# Patient Record
Sex: MALE | Race: WHITE | NOT HISPANIC OR LATINO | Employment: OTHER | URBAN - NONMETROPOLITAN AREA
[De-identification: names, ages, dates, MRNs, and addresses within clinical notes are randomized per-mention and may not be internally consistent; named-entity substitution may affect disease eponyms.]

---

## 2018-07-20 ENCOUNTER — OFFICE VISIT (OUTPATIENT)
Dept: UROLOGY | Facility: CLINIC | Age: 58
End: 2018-07-20

## 2018-07-20 ENCOUNTER — HOSPITAL ENCOUNTER (OUTPATIENT)
Dept: GENERAL RADIOLOGY | Facility: HOSPITAL | Age: 58
Discharge: HOME | End: 2018-07-20
Attending: UROLOGY | Admitting: UROLOGY

## 2018-07-20 VITALS — WEIGHT: 195 LBS | BODY MASS INDEX: 28.88 KG/M2 | HEIGHT: 69 IN

## 2018-07-20 DIAGNOSIS — N39.0 URINARY TRACT INFECTION WITH HEMATURIA, SITE UNSPECIFIED: ICD-10-CM

## 2018-07-20 DIAGNOSIS — N20.0 RENAL STONE: Primary | ICD-10-CM

## 2018-07-20 DIAGNOSIS — R31.9 URINARY TRACT INFECTION WITH HEMATURIA, SITE UNSPECIFIED: ICD-10-CM

## 2018-07-20 DIAGNOSIS — N20.0 RENAL STONE: ICD-10-CM

## 2018-07-20 LAB
BILIRUB BLD-MCNC: NEGATIVE MG/DL
CLARITY, POC: CLEAR
COLOR UR: YELLOW
GLUCOSE UR STRIP-MCNC: NEGATIVE MG/DL
KETONES UR QL: NEGATIVE
LEUKOCYTE EST, POC: ABNORMAL
NITRITE UR-MCNC: NEGATIVE MG/ML
PH UR: 5.5 [PH] (ref 5–8)
PROT UR STRIP-MCNC: NEGATIVE MG/DL
RBC # UR STRIP: ABNORMAL /UL
SP GR UR: 1.02 (ref 1–1.03)
UROBILINOGEN UR QL: NORMAL

## 2018-07-20 PROCEDURE — 99204 OFFICE O/P NEW MOD 45 MIN: CPT | Performed by: UROLOGY

## 2018-07-20 PROCEDURE — 74018 RADEX ABDOMEN 1 VIEW: CPT

## 2018-07-20 PROCEDURE — 81003 URINALYSIS AUTO W/O SCOPE: CPT | Performed by: UROLOGY

## 2018-07-20 PROCEDURE — 74019 RADEX ABDOMEN 2 VIEWS: CPT | Performed by: RADIOLOGY

## 2018-07-20 RX ORDER — TAMSULOSIN HYDROCHLORIDE 0.4 MG/1
1 CAPSULE ORAL NIGHTLY
Qty: 30 CAPSULE | Refills: 1 | Status: SHIPPED | OUTPATIENT
Start: 2018-07-20

## 2018-07-20 RX ORDER — OXYCODONE AND ACETAMINOPHEN 10; 325 MG/1; MG/1
1 TABLET ORAL EVERY 6 HOURS PRN
Qty: 20 TABLET | Refills: 0 | Status: SHIPPED | OUTPATIENT
Start: 2018-07-20 | End: 2019-07-20

## 2018-07-20 RX ORDER — PANTOPRAZOLE SODIUM 40 MG/1
40 TABLET, DELAYED RELEASE ORAL
Status: ON HOLD | COMMUNITY
End: 2021-03-08

## 2018-07-20 RX ORDER — GABAPENTIN 800 MG/1
TABLET ORAL
Status: ON HOLD | COMMUNITY
Start: 2018-07-19 | End: 2021-03-08

## 2018-07-20 RX ORDER — HYDROCODONE BITARTRATE AND ACETAMINOPHEN 7.5; 325 MG/1; MG/1
1 TABLET ORAL EVERY 6 HOURS PRN
Refills: 0 | Status: ON HOLD | COMMUNITY
Start: 2018-07-18 | End: 2021-03-08

## 2018-07-20 NOTE — PROGRESS NOTES
Chief Complaint:          Chief Complaint   Patient presents with   • Nephrolithiasis       HPI:   58 y.o. male.  58-year-old white male who presents with stones he said he went the [facility] 1 week ago was told he had a right stone and a  left stone.  He was not given a CT disc, he was not given information, he was given several Percocet, he passed some  Stone 3 days ago he says he has nausea he says he has severe pain but he doesn't appear to be discomfort he says his Temp is 100 but he is afebrile here, his  Urine is negative for infection he says his extreme pain in the right ,a KUB was completely negative today he has known von Willebrand's disease and follows of the hemophilia clinic he's not had a bleed since last December I'm undergo ahead and refill his pain medication 100 and medical expulsive therapy he's going to come back Monday with his disc so we can see what he's talking about as his KUB is completely negative.  He's to call me this weekend should he have any additional complaints or problems    Past Medical History:        Past Medical History:   Diagnosis Date   • COPD (chronic obstructive pulmonary disease) (CMS/[facility])    • Kidney stone    • Von Willebrand disease (CMS/[facility])          Current Meds:     Current Outpatient Prescriptions   Medication Sig Dispense Refill   • gabapentin (NEURONTIN) 800 MG tablet      • HYDROcodone-acetaminophen (NORCO) 7.5-325 MG per tablet Take 1 tablet by mouth Every 6 (Six) Hours As Needed. for pain  0   • pantoprazole (PROTONIX) 40 MG EC tablet Take 40 mg by mouth.       No current facility-administered medications for this visit.         Allergies:      Allergies   Allergen Reactions   • Aspirin Hives   • Toradol [Ketorolac Tromethamine] Hives   • Zofran [Ondansetron Hcl] Hives        Past Surgical History:     Past Surgical History:   Procedure Laterality Date   • BACK SURGERY     • KNEE SURGERY     • STOMACH SURGERY           Social History:     Social History      Social History   • Marital status: Unknown     Spouse name: N/A   • Number of children: N/A   • Years of education: N/A     Occupational History   • Not on file.     Social History Main Topics   • Smoking status: Current Every Day Smoker   • Smokeless tobacco: Never Used   • Alcohol use No   • Drug use: No   • Sexual activity: Not on file     Other Topics Concern   • Not on file     Social History Narrative   • No narrative on file       Family History:     Family History   Problem Relation Age of Onset   • No Known Problems Father    • No Known Problems Mother        Review of Systems:     Review of Systems   Constitutional: Negative.    HENT: Negative.    Eyes: Negative.    Respiratory: Negative.    Cardiovascular: Negative.    Gastrointestinal: Negative.    Endocrine: Negative.    Genitourinary: Positive for flank pain and hematuria.   Allergic/Immunologic: Negative.    Neurological: Negative.    Hematological: Negative.    Psychiatric/Behavioral: Negative.        Physical Exam:     Physical Exam   Constitutional: He is oriented to person, place, and time. He appears well-developed and well-nourished.   HENT:   Head: Normocephalic and atraumatic.   Eyes: Pupils are equal, round, and reactive to light. Conjunctivae and EOM are normal.   Neck: Normal range of motion.   Cardiovascular: Normal rate, regular rhythm, normal heart sounds and intact distal pulses.    Pulmonary/Chest: Effort normal and breath sounds normal.   Abdominal: Soft. Bowel sounds are normal.   Musculoskeletal: Normal range of motion.   Neurological: He is alert and oriented to person, place, and time. He has normal reflexes.   Skin: Skin is warm and dry.   Psychiatric: He has a normal mood and affect. His behavior is normal. Judgment and thought content normal.   Nursing note and vitals reviewed.      I have reviewed the following portions of the patient's history: allergies, current medications, past family history, past medical history,  past social history, past surgical history, problem list and ROS and confirm it's accurate.      Procedure:       Assessment/Plan:   Renal calculus-we discussed the presence of the stone we discussed the various therapeutic options available including percutaneous nephrostolithotomy, lithotripsy.  We discussed the risks of lithotripsy including the passage of stones the development of a large string of stones in the distal ureter known as Steinstrasse.  In the 3% incidence of that we will need to proceed with a ureteroscopy for obstructing fragments.  Extremely rare incidence of renal hematoma.  And the significance of this.  We discussed the absolute relative indicators for intervention including the presence of sepsis, and pain we cannot control is the primary need for urgent intervention.  We discussed placement of a stent if indicated and the management of the stent as well.     Patient's Body mass index is 28.8 kg/m². BMI is above normal parameters. Recommendations include: educational material.          This document has been electronically signed by SHYAM JAIME MD July 20, 2018 9:01 AM

## 2018-08-23 ENCOUNTER — OFFICE VISIT (OUTPATIENT)
Dept: UROLOGY | Facility: CLINIC | Age: 58
End: 2018-08-23

## 2018-08-23 VITALS — BODY MASS INDEX: 28.88 KG/M2 | WEIGHT: 195 LBS | HEIGHT: 69 IN

## 2018-08-23 DIAGNOSIS — R31.0 GROSS HEMATURIA: Primary | ICD-10-CM

## 2018-08-23 PROCEDURE — 99213 OFFICE O/P EST LOW 20 MIN: CPT | Performed by: UROLOGY

## 2018-08-23 NOTE — PROGRESS NOTES
Chief Complaint:          Chief Complaint   Patient presents with   • Flank Pain       HPI:   58 y.o. male.  58-year-old white male who presents with stones he said he went the [place] ER 1 week ago was told he had a right stone and a  left stone.  He was not given a CT disc, he was not given information, he was given several Percocet, he passed some  Stone 3 days ago he says he has nausea he says he has severe pain but he doesn't appear to be discomfort he says his Temp is 100 but he is afebrile here, his  Urine is negative for infection he says his extreme pain in the right ,a KUB was completely negative today he has known von Willebrand's disease and follows of the hemophilia clinic he's not had a bleed since last December I'm undergo ahead and refill his pain medication 100 and medical expulsive therapy he's going to come back Monday with his disc so we can see what he's talking about as his KUB is completely negative.  He's to call me this weekend should he have any additional complaints or problems.  He returns today.  He says he have a significant pressure and blood in the urine although is clear during the day he has known hemophilia.  Unfortunately, he failed to get his disc again he states the hospital refused to give a total because of his age I would repeat a noncontrasted CT to assess the upper tracts.  I explained to him I cannot give him pain medication until I have an absolute diagnosis    Past Medical History:        Past Medical History:   Diagnosis Date   • COPD (chronic obstructive pulmonary disease) (CMS/[facility])    • Kidney stone    • Von Willebrand disease (CMS/[facility])          Current Meds:     Current Outpatient Prescriptions   Medication Sig Dispense Refill   • gabapentin (NEURONTIN) 800 MG tablet      • HYDROcodone-acetaminophen (NORCO) 7.5-325 MG per tablet Take 1 tablet by mouth Every 6 (Six) Hours As Needed. for pain  0   • oxyCODONE-acetaminophen (PERCOCET)  MG per tablet Take 1  tablet by mouth Every 6 (Six) Hours As Needed for Moderate Pain . 20 tablet 0   • pantoprazole (PROTONIX) 40 MG EC tablet Take 40 mg by mouth.     • tamsulosin (FLOMAX) 0.4 MG capsule 24 hr capsule Take 1 capsule by mouth Every Night. 30 capsule 1     No current facility-administered medications for this visit.         Allergies:      Allergies   Allergen Reactions   • Aspirin Hives   • Toradol [Ketorolac Tromethamine] Hives   • Zofran [Ondansetron Hcl] Hives        Past Surgical History:     Past Surgical History:   Procedure Laterality Date   • BACK SURGERY     • KNEE SURGERY     • STOMACH SURGERY           Social History:     Social History     Social History   • Marital status:      Spouse name: N/A   • Number of children: N/A   • Years of education: N/A     Occupational History   • Not on file.     Social History Main Topics   • Smoking status: Current Every Day Smoker   • Smokeless tobacco: Never Used   • Alcohol use No   • Drug use: No   • Sexual activity: Not on file     Other Topics Concern   • Not on file     Social History Narrative   • No narrative on file       Family History:     Family History   Problem Relation Age of Onset   • No Known Problems Father    • No Known Problems Mother        Review of Systems:     Review of Systems   Constitutional: Negative.    HENT: Negative.    Eyes: Negative.    Respiratory: Negative.    Cardiovascular: Negative.    Gastrointestinal: Negative.    Endocrine: Negative.    Genitourinary: Positive for hematuria.   Musculoskeletal: Negative.    Allergic/Immunologic: Negative.    Neurological: Negative.    Hematological: Negative.    Psychiatric/Behavioral: Negative.        Physical Exam:     Physical Exam   Constitutional: He is oriented to person, place, and time. He appears well-developed and well-nourished.   HENT:   Head: Normocephalic and atraumatic.   Eyes: Pupils are equal, round, and reactive to light. Conjunctivae and EOM are normal.   Neck: Normal range  of motion.   Cardiovascular: Normal rate, regular rhythm, normal heart sounds and intact distal pulses.    Pulmonary/Chest: Effort normal and breath sounds normal.   Abdominal: Soft. Bowel sounds are normal.   Musculoskeletal: Normal range of motion.   Neurological: He is alert and oriented to person, place, and time. He has normal reflexes.   Skin: Skin is warm and dry.   Psychiatric: He has a normal mood and affect. His behavior is normal. Judgment and thought content normal.   Nursing note and vitals reviewed.      I have reviewed the following portions of the patient's history: allergies, current medications, past family history, past medical history, past social history, past surgical history, problem list and ROS and confirm it's accurate.      Procedure:       Assessment/Plan:   Hematuria-patient was diagnosed with hematuria.  We discussed the significance of microscopic hematuria versus gross hematuria.  We discussed the presence or absence of the type of clotting identified including vermiform clots consistent with ureteral bleeding versus just pink tinged urine versus harmony clots.  We discussed the presence of urokinase in the urine which causes the clots to dissolve with time.  We discussed the fact that it takes only a very small amount of blood in the urine to make the urine very red appearing and therefore give one the impression that there is much more blood loss that is really present.  He discussed the use of both an upper and lower tract investigation.  I discussed the fact that an upper tract investigation includes a normal renal ultrasound with a significant risks of missing more subtle lesions.  Progressing to a CT scan without contrast and finally the CT scan with contrast being the gold standard to diagnose the small neoplasms.  We discussed the lower tract investigation consisting of a cystoscopy in many of the cases where the upper tract study is negative.  Also discussed the fact that if  there is a contraindication to the use of contrast we would do a noncontrasted study and this also has a chance of missing small lesions.  The specific instance would be cases of diabetes and chronic renal insufficiency.  Discussed the fact that there is about a 96% chance of a negative workup with episodes of microscopic hematuria and with much greater in the face of gross hematuria.  We discussed the fact that this is a non-cumulative test.  In other words if there is hematuria next year I would recommend continuing to work up the condition because of the fact that neoplasms may be small at the first workup and easily are missed.  I discussed the differential diagnosis of hematuria including trauma, neoplasia, infection, etc.  We discussed the fact that if there is any history of chronic kidney disease or risk factors such as diabetes for contrast a noncontrasted study will be utilized.  We will initiate an investigation.  He has known hemophilia, he had a noncontrasted CT at the [facility] but they will not give a tone.  I'm going to repeated because it's now dated given the acute nature of his problem may need to rule out upper tract deterioration.  I'll see him back as it was a get the results     Patient's Body mass index is 28.78 kg/m². BMI is above normal parameters. Recommendations include: educational material.          This document has been electronically signed by SHYAM JAIME MD August 23, 2018 2:49 PM

## 2018-08-24 PROBLEM — R31.0 GROSS HEMATURIA: Status: ACTIVE | Noted: 2018-08-24

## 2021-03-08 ENCOUNTER — HOSPITAL ENCOUNTER (INPATIENT)
Facility: HOSPITAL | Age: 61
LOS: 5 days | Discharge: HOME | End: 2021-03-13
Attending: PSYCHIATRY & NEUROLOGY | Admitting: PSYCHIATRY & NEUROLOGY

## 2021-03-08 ENCOUNTER — HOSPITAL ENCOUNTER (EMERGENCY)
Facility: HOSPITAL | Age: 61
Discharge: PSYCHIATRIC OR SUBSTANCE USE FACILITY | End: 2021-03-08
Attending: STUDENT IN AN ORGANIZED HEALTH CARE EDUCATION/TRAINING PROGRAM | Admitting: STUDENT IN AN ORGANIZED HEALTH CARE EDUCATION/TRAINING PROGRAM

## 2021-03-08 VITALS
RESPIRATION RATE: 18 BRPM | HEIGHT: 69 IN | DIASTOLIC BLOOD PRESSURE: 72 MMHG | BODY MASS INDEX: 31.1 KG/M2 | OXYGEN SATURATION: 100 % | SYSTOLIC BLOOD PRESSURE: 122 MMHG | HEART RATE: 88 BPM | TEMPERATURE: 98.5 F | WEIGHT: 210 LBS

## 2021-03-08 DIAGNOSIS — F10.10 ALCOHOL ABUSE: ICD-10-CM

## 2021-03-08 DIAGNOSIS — F32.A DEPRESSION WITH SUICIDAL IDEATION: Primary | ICD-10-CM

## 2021-03-08 DIAGNOSIS — R45.851 DEPRESSION WITH SUICIDAL IDEATION: Primary | ICD-10-CM

## 2021-03-08 LAB
6-ACETYL MORPHINE: NEGATIVE
ALBUMIN SERPL-MCNC: 3.91 G/DL (ref 3.5–5.2)
ALBUMIN/GLOB SERPL: 0.9 G/DL
ALP SERPL-CCNC: 146 U/L (ref 39–117)
ALT SERPL W P-5'-P-CCNC: 68 U/L (ref 1–41)
AMPHET+METHAMPHET UR QL: NEGATIVE
ANION GAP SERPL CALCULATED.3IONS-SCNC: 13.6 MMOL/L (ref 5–15)
ANISOCYTOSIS BLD QL: NORMAL
AST SERPL-CCNC: 76 U/L (ref 1–40)
BACTERIA UR QL AUTO: ABNORMAL /HPF
BARBITURATES UR QL SCN: NEGATIVE
BASOPHILS # BLD AUTO: 0.06 10*3/MM3 (ref 0–0.2)
BASOPHILS NFR BLD AUTO: 0.6 % (ref 0–1.5)
BENZODIAZ UR QL SCN: NEGATIVE
BILIRUB SERPL-MCNC: 0.6 MG/DL (ref 0–1.2)
BILIRUB UR QL STRIP: ABNORMAL
BUN SERPL-MCNC: 14 MG/DL (ref 6–20)
BUN/CREAT SERPL: 18.4 (ref 7–25)
BUPRENORPHINE SERPL-MCNC: NEGATIVE NG/ML
CALCIUM SPEC-SCNC: 9.1 MG/DL (ref 8.6–10.5)
CANNABINOIDS SERPL QL: NEGATIVE
CHLORIDE SERPL-SCNC: 105 MMOL/L (ref 98–107)
CLARITY UR: CLEAR
CO2 SERPL-SCNC: 22.4 MMOL/L (ref 22–29)
COCAINE UR QL: NEGATIVE
COLOR UR: ABNORMAL
CREAT SERPL-MCNC: 0.76 MG/DL (ref 0.76–1.27)
DEPRECATED RDW RBC AUTO: 47.5 FL (ref 37–54)
EOSINOPHIL # BLD AUTO: 0.11 10*3/MM3 (ref 0–0.4)
EOSINOPHIL NFR BLD AUTO: 1.2 % (ref 0.3–6.2)
ERYTHROCYTE [DISTWIDTH] IN BLOOD BY AUTOMATED COUNT: 18.5 % (ref 12.3–15.4)
ETHANOL BLD-MCNC: 64 MG/DL (ref 0–10)
ETHANOL UR QL: 0.06 %
FLUAV RNA RESP QL NAA+PROBE: NOT DETECTED
FLUBV RNA RESP QL NAA+PROBE: NOT DETECTED
GFR SERPL CREATININE-BSD FRML MDRD: 105 ML/MIN/1.73
GLOBULIN UR ELPH-MCNC: 4.5 GM/DL
GLUCOSE SERPL-MCNC: 83 MG/DL (ref 65–99)
GLUCOSE UR STRIP-MCNC: NEGATIVE MG/DL
HCT VFR BLD AUTO: 38.5 % (ref 37.5–51)
HGB BLD-MCNC: 11 G/DL (ref 13–17.7)
HGB UR QL STRIP.AUTO: ABNORMAL
HOLD SPECIMEN: NORMAL
HOLD SPECIMEN: NORMAL
HYALINE CASTS UR QL AUTO: ABNORMAL /LPF
HYPOCHROMIA BLD QL: NORMAL
IMM GRANULOCYTES # BLD AUTO: 0.04 10*3/MM3 (ref 0–0.05)
IMM GRANULOCYTES NFR BLD AUTO: 0.4 % (ref 0–0.5)
KETONES UR QL STRIP: ABNORMAL
LEUKOCYTE ESTERASE UR QL STRIP.AUTO: ABNORMAL
LYMPHOCYTES # BLD AUTO: 2.15 10*3/MM3 (ref 0.7–3.1)
LYMPHOCYTES NFR BLD AUTO: 23.2 % (ref 19.6–45.3)
MAGNESIUM SERPL-MCNC: 2 MG/DL (ref 1.6–2.6)
MCH RBC QN AUTO: 20.9 PG (ref 26.6–33)
MCHC RBC AUTO-ENTMCNC: 28.6 G/DL (ref 31.5–35.7)
MCV RBC AUTO: 73.1 FL (ref 79–97)
METHADONE UR QL SCN: NEGATIVE
MICROCYTES BLD QL: NORMAL
MONOCYTES # BLD AUTO: 0.58 10*3/MM3 (ref 0.1–0.9)
MONOCYTES NFR BLD AUTO: 6.3 % (ref 5–12)
NEUTROPHILS NFR BLD AUTO: 6.33 10*3/MM3 (ref 1.7–7)
NEUTROPHILS NFR BLD AUTO: 68.3 % (ref 42.7–76)
NITRITE UR QL STRIP: NEGATIVE
NRBC BLD AUTO-RTO: 0 /100 WBC (ref 0–0.2)
OPIATES UR QL: NEGATIVE
OXYCODONE UR QL SCN: NEGATIVE
PCP UR QL SCN: NEGATIVE
PH UR STRIP.AUTO: 5.5 [PH] (ref 5–8)
PLAT MORPH BLD: NORMAL
PLATELET # BLD AUTO: 420 10*3/MM3 (ref 140–450)
PMV BLD AUTO: 8.6 FL (ref 6–12)
POTASSIUM SERPL-SCNC: 3.4 MMOL/L (ref 3.5–5.2)
PROT SERPL-MCNC: 8.4 G/DL (ref 6–8.5)
PROT UR QL STRIP: ABNORMAL
RBC # BLD AUTO: 5.27 10*6/MM3 (ref 4.14–5.8)
RBC # UR: ABNORMAL /HPF
REF LAB TEST METHOD: ABNORMAL
SARS-COV-2 RNA RESP QL NAA+PROBE: NOT DETECTED
SODIUM SERPL-SCNC: 141 MMOL/L (ref 136–145)
SP GR UR STRIP: 1.03 (ref 1–1.03)
SQUAMOUS #/AREA URNS HPF: ABNORMAL /HPF
UROBILINOGEN UR QL STRIP: ABNORMAL
WBC # BLD AUTO: 9.27 10*3/MM3 (ref 3.4–10.8)
WBC UR QL AUTO: ABNORMAL /HPF
WHOLE BLOOD HOLD SPECIMEN: NORMAL
WHOLE BLOOD HOLD SPECIMEN: NORMAL

## 2021-03-08 PROCEDURE — 80307 DRUG TEST PRSMV CHEM ANLYZR: CPT | Performed by: PHYSICIAN ASSISTANT

## 2021-03-08 PROCEDURE — 99285 EMERGENCY DEPT VISIT HI MDM: CPT

## 2021-03-08 PROCEDURE — 85025 COMPLETE CBC W/AUTO DIFF WBC: CPT | Performed by: PHYSICIAN ASSISTANT

## 2021-03-08 PROCEDURE — C9803 HOPD COVID-19 SPEC COLLECT: HCPCS

## 2021-03-08 PROCEDURE — 93010 ELECTROCARDIOGRAM REPORT: CPT | Performed by: INTERNAL MEDICINE

## 2021-03-08 PROCEDURE — 83735 ASSAY OF MAGNESIUM: CPT | Performed by: PHYSICIAN ASSISTANT

## 2021-03-08 PROCEDURE — 85007 BL SMEAR W/DIFF WBC COUNT: CPT | Performed by: PHYSICIAN ASSISTANT

## 2021-03-08 PROCEDURE — 93005 ELECTROCARDIOGRAM TRACING: CPT | Performed by: PSYCHIATRY & NEUROLOGY

## 2021-03-08 PROCEDURE — 80053 COMPREHEN METABOLIC PANEL: CPT | Performed by: PHYSICIAN ASSISTANT

## 2021-03-08 PROCEDURE — 82077 ASSAY SPEC XCP UR&BREATH IA: CPT | Performed by: PHYSICIAN ASSISTANT

## 2021-03-08 PROCEDURE — 87636 SARSCOV2 & INF A&B AMP PRB: CPT | Performed by: PHYSICIAN ASSISTANT

## 2021-03-08 PROCEDURE — 81001 URINALYSIS AUTO W/SCOPE: CPT | Performed by: PHYSICIAN ASSISTANT

## 2021-03-08 RX ORDER — BISACODYL 5 MG/1
5 TABLET, DELAYED RELEASE ORAL DAILY PRN
Status: DISCONTINUED | OUTPATIENT
Start: 2021-03-08 | End: 2021-03-13 | Stop reason: HOSPADM

## 2021-03-08 RX ORDER — ALBUTEROL SULFATE 90 UG/1
1 AEROSOL, METERED RESPIRATORY (INHALATION) DAILY PRN
Status: DISCONTINUED | OUTPATIENT
Start: 2021-03-08 | End: 2021-03-10

## 2021-03-08 RX ORDER — PANTOPRAZOLE SODIUM 40 MG/1
40 TABLET, DELAYED RELEASE ORAL DAILY
Status: DISCONTINUED | OUTPATIENT
Start: 2021-03-09 | End: 2021-03-13 | Stop reason: HOSPADM

## 2021-03-08 RX ORDER — BENZONATATE 100 MG/1
100 CAPSULE ORAL 3 TIMES DAILY PRN
Status: CANCELLED | OUTPATIENT
Start: 2021-03-08

## 2021-03-08 RX ORDER — BENZONATATE 100 MG/1
100 CAPSULE ORAL 3 TIMES DAILY PRN
Status: DISCONTINUED | OUTPATIENT
Start: 2021-03-08 | End: 2021-03-13 | Stop reason: HOSPADM

## 2021-03-08 RX ORDER — LOPERAMIDE HYDROCHLORIDE 2 MG/1
2 CAPSULE ORAL
Status: DISCONTINUED | OUTPATIENT
Start: 2021-03-08 | End: 2021-03-13 | Stop reason: HOSPADM

## 2021-03-08 RX ORDER — TRAZODONE HYDROCHLORIDE 50 MG/1
12.5 TABLET ORAL NIGHTLY PRN
Status: DISPENSED | OUTPATIENT
Start: 2021-03-08 | End: 2021-03-10

## 2021-03-08 RX ORDER — BENZONATATE 100 MG/1
100 CAPSULE ORAL 3 TIMES DAILY PRN
COMMUNITY

## 2021-03-08 RX ORDER — ALBUTEROL SULFATE 90 UG/1
1 AEROSOL, METERED RESPIRATORY (INHALATION) DAILY PRN
COMMUNITY

## 2021-03-08 RX ORDER — LANOLIN ALCOHOL/MO/W.PET/CERES
3 CREAM (GRAM) TOPICAL NIGHTLY PRN
Status: DISCONTINUED | OUTPATIENT
Start: 2021-03-10 | End: 2021-03-13 | Stop reason: HOSPADM

## 2021-03-08 RX ORDER — ACETAMINOPHEN 325 MG/1
650 TABLET ORAL EVERY 6 HOURS PRN
Status: DISCONTINUED | OUTPATIENT
Start: 2021-03-08 | End: 2021-03-08

## 2021-03-08 RX ORDER — PANTOPRAZOLE SODIUM 40 MG/1
40 TABLET, DELAYED RELEASE ORAL DAILY
COMMUNITY

## 2021-03-08 RX ORDER — TAMSULOSIN HYDROCHLORIDE 0.4 MG/1
0.4 CAPSULE ORAL NIGHTLY
Status: DISCONTINUED | OUTPATIENT
Start: 2021-03-08 | End: 2021-03-13 | Stop reason: HOSPADM

## 2021-03-08 RX ORDER — ECHINACEA PURPUREA EXTRACT 125 MG
2 TABLET ORAL AS NEEDED
Status: DISCONTINUED | OUTPATIENT
Start: 2021-03-08 | End: 2021-03-13 | Stop reason: HOSPADM

## 2021-03-08 RX ORDER — ALUMINA, MAGNESIA, AND SIMETHICONE 2400; 2400; 240 MG/30ML; MG/30ML; MG/30ML
15 SUSPENSION ORAL EVERY 6 HOURS PRN
Status: DISCONTINUED | OUTPATIENT
Start: 2021-03-08 | End: 2021-03-13 | Stop reason: HOSPADM

## 2021-03-08 RX ORDER — LOSARTAN POTASSIUM AND HYDROCHLOROTHIAZIDE 25; 100 MG/1; MG/1
1 TABLET ORAL DAILY
COMMUNITY

## 2021-03-08 RX ORDER — POTASSIUM CHLORIDE 20 MEQ/1
20 TABLET, EXTENDED RELEASE ORAL ONCE
Status: DISCONTINUED | OUTPATIENT
Start: 2021-03-08 | End: 2021-03-08 | Stop reason: HOSPADM

## 2021-03-08 RX ADMIN — TRAZODONE HYDROCHLORIDE 12.5 MG: 50 TABLET ORAL at 22:33

## 2021-03-08 RX ADMIN — TAMSULOSIN HYDROCHLORIDE 0.4 MG: 0.4 CAPSULE ORAL at 21:24

## 2021-03-08 RX ADMIN — Medication 100 MG: at 14:09

## 2021-03-08 RX ADMIN — LOSARTAN POTASSIUM: 50 TABLET, FILM COATED ORAL at 18:42

## 2021-03-08 NOTE — ED NOTES
"Step daughter whom brought pt states after pt departed triage that pt is \"a real bad alcoholic, drinks everyday and needs help with it, if he doesn't get it he shakes real bad\" she is leaving but left her contact number [phone]     Aditi Brewer, RN  03/08/21 1022    "

## 2021-03-08 NOTE — NURSING NOTE
Intake assessment completed. Pt self presents to our ER with complaints of increased depression and suicidal ideation. He states SI thoughts began around 2 weeks ago and he has been thinking of shooting himself or jumping off of a building. States symptoms worsened after his sister  in a car accident two weeks ago. Reports he was treated for anxiety in the past but never for depression. The pt also states he drinks 4 double shots of fire ball whisky daily, over the last 6 months. He had previously struggled with alcoholism but was sober for 8 years before relapsing. States he drank 2 shots this morning. He denies hx of complicated withdrawal or any withdrawal symptoms at this time. The pt states that he sleeps about 1 hour each night, then naps throughout the day. Reports poor appetite. He denies further substance use. He rates anxiety 10/10 and depression 10/10. Denies HI AVH and was not delusional.

## 2021-03-08 NOTE — ED PROVIDER NOTES
"Subjective   60-year-old male who presents to the ED today for mental health evaluation.  He states he has been very depressed recently due to family issues.  He states \"life has caught up with me.\"  He states for the last 2 weeks he has been having suicidal ideations.  He states he has made a plan to either jump off a building or shoot himself.  He denies any homicidal ideations.  He denies any drug use.  He states he does drink alcohol, usually every other day.  He states when he drinks he drinks 4 double fireball shots per day.  He states he did have one shot this morning.  He states his appetite and sleep have both been poor.  He denies any hallucinations.      History provided by:  Patient  Mental Health Problem  Presenting symptoms: depression and suicidal thoughts    Degree of incapacity (severity):  Severe  Onset quality:  Gradual  Duration:  2 weeks  Timing:  Constant  Progression:  Worsening  Chronicity:  New  Context: alcohol use    Context: not drug abuse    Relieved by:  Nothing  Worsened by:  Family interactions  Associated symptoms: appetite change and insomnia        Review of Systems   Constitutional: Positive for appetite change.   HENT: Negative.    Eyes: Negative.    Respiratory: Negative.    Cardiovascular: Negative.    Gastrointestinal: Negative.    Genitourinary: Negative.    Musculoskeletal: Negative.    Skin: Negative.    Neurological: Negative.    Psychiatric/Behavioral: Positive for dysphoric mood, sleep disturbance and suicidal ideas. The patient has insomnia.    All other systems reviewed and are negative.      Past Medical History:   Diagnosis Date   • Alcoholism (CMS/H[facility])    • COPD (chronic obstructive pulmonary disease) (CMS/H[facility])    • Kidney stone    • Liver disease    • Von Willebrand disease (CMS/[facility])        Allergies   Allergen Reactions   • Aspirin Hives   • Azithromycin Hives   • Toradol [Ketorolac Tromethamine] Hives   • Zofran [Ondansetron Hcl] Hives       Past Surgical " History:   Procedure Laterality Date   • BACK SURGERY     • KNEE SURGERY     • STOMACH SURGERY         Family History   Problem Relation Age of Onset   • No Known Problems Father    • No Known Problems Mother    • Suicide Attempts Maternal Uncle    • Depression Maternal Uncle    • Alcohol abuse Maternal Uncle    • Alcohol abuse Paternal Uncle        Social History     Socioeconomic History   • Marital status:      Spouse name: Not on file   • Number of children: Not on file   • Years of education: Not on file   • Highest education level: Not on file   Tobacco Use   • Smoking status: Current Every Day Smoker     Packs/day: 0.50     Types: Cigarettes   • Smokeless tobacco: Never Used   Substance and Sexual Activity   • Alcohol use: Yes     Comment: see below   • Drug use: Yes     Comment: ETOH   • Sexual activity: Defer           Objective   Physical Exam  Vitals and nursing note reviewed.   Constitutional:       General: He is not in acute distress.     Appearance: Normal appearance.   HENT:      Head: Normocephalic and atraumatic.      Right Ear: External ear normal.      Left Ear: External ear normal.   Eyes:      Conjunctiva/sclera: Conjunctivae normal.      Pupils: Pupils are equal, round, and reactive to light.   Cardiovascular:      Rate and Rhythm: Normal rate and regular rhythm.      Pulses: Normal pulses.      Heart sounds: Normal heart sounds.   Pulmonary:      Effort: Pulmonary effort is normal.      Breath sounds: Normal breath sounds.   Abdominal:      General: Bowel sounds are normal.      Palpations: Abdomen is soft.      Tenderness: There is no abdominal tenderness.   Musculoskeletal:         General: Normal range of motion.      Cervical back: Normal range of motion and neck supple.   Skin:     General: Skin is warm and dry.      Capillary Refill: Capillary refill takes less than 2 seconds.   Neurological:      General: No focal deficit present.      Mental Status: He is alert and oriented to  person, place, and time.   Psychiatric:         Mood and Affect: Mood is depressed.         Speech: Speech normal.         Behavior: Behavior normal. Behavior is cooperative.         Thought Content: Thought content includes suicidal ideation. Thought content does not include homicidal ideation. Thought content includes suicidal plan.         Procedures           ED Course  ED Course as of Mar 08 1235   Mon Mar 08, 2021   1134 COVID19: Not Detected [AH]   1157 Medically clear for psych    [AH]      ED Course User Index  [] Reta Hunter, PA                                           MDM  Number of Diagnoses or Management Options     Amount and/or Complexity of Data Reviewed  Clinical lab tests: reviewed    Patient Progress  Patient progress: stable      Final diagnoses:   Depression with suicidal ideation   Alcohol abuse            Reta Hunter PA  03/08/21 1709

## 2021-03-08 NOTE — NURSING NOTE
Presented clinicals to Dr Rosario, new orders to admit to [facility]. RO SP3. Recheck yK once per shift.

## 2021-03-08 NOTE — NURSING NOTE
"Admission assessment completed. Patient admitted from Intake after presenting with complaints of increased depression and suicidal ideation for the last 2 weeks.Patient states he has been thinking of shooting himself or jumping off of a building. Patient reports his sister  in a car accident two weeks ago and that is what is causing his depression. The pt also states he drinks 4 double shots of fire ball whisky daily, over the last 6 months. Patient reports he was sober for 8 years before relapsing 6 months ago. Patient states \"I do fine with it and can stop drinking at any time.\" He denies hx of complicated withdrawal or any withdrawal symptoms at this time.  Reports poor sleep and appetite. Denies HI or AVH.   "

## 2021-03-09 LAB
QT INTERVAL: 372 MS
QTC INTERVAL: 491 MS

## 2021-03-09 PROCEDURE — HZ2ZZZZ DETOXIFICATION SERVICES FOR SUBSTANCE ABUSE TREATMENT: ICD-10-PCS | Performed by: PSYCHIATRY & NEUROLOGY

## 2021-03-09 PROCEDURE — 99223 1ST HOSP IP/OBS HIGH 75: CPT | Performed by: PSYCHIATRY & NEUROLOGY

## 2021-03-09 RX ADMIN — TRAZODONE HYDROCHLORIDE 12.5 MG: 50 TABLET ORAL at 22:10

## 2021-03-09 RX ADMIN — ALBUTEROL SULFATE 1 PUFF: 90 AEROSOL, METERED RESPIRATORY (INHALATION) at 08:22

## 2021-03-09 RX ADMIN — SERTRALINE 25 MG: 50 TABLET, FILM COATED ORAL at 13:09

## 2021-03-09 RX ADMIN — PANTOPRAZOLE SODIUM 40 MG: 40 TABLET, DELAYED RELEASE ORAL at 08:23

## 2021-03-09 RX ADMIN — METOPROLOL TARTRATE 25 MG: 25 TABLET, FILM COATED ORAL at 08:23

## 2021-03-09 RX ADMIN — TAMSULOSIN HYDROCHLORIDE 0.4 MG: 0.4 CAPSULE ORAL at 20:00

## 2021-03-09 RX ADMIN — Medication 100 MG: at 08:23

## 2021-03-09 RX ADMIN — ALUMINUM HYDROXIDE, MAGNESIUM HYDROXIDE, AND DIMETHICONE 15 ML: 400; 400; 40 SUSPENSION ORAL at 19:57

## 2021-03-09 RX ADMIN — LOSARTAN POTASSIUM: 50 TABLET, FILM COATED ORAL at 08:23

## 2021-03-09 NOTE — PLAN OF CARE
Goal Outcome Evaluation:  Plan of Care Reviewed With: patient  Progress: no change  Outcome Summary: Patient rates Anx 8 Dep 9 Denies SI, HI, or AVH reports poor appetite and sleep pattern calm, cooperative, and currently sleeping will continue to monitor

## 2021-03-09 NOTE — H&P
INITIAL PSYCHIATRIC HISTORY & PHYSICAL    Patient Identification:  Name:  Venancio Hua  Age:  60 y.o.  Sex:  male  :  1960  MRN:  [id number]   Visit Number:  [id number]  Primary Care Physician:  Lili Borja APRN    SUBJECTIVE    CC/Focus of Exam: Depression and suicidal ideation    HPI: Venancio Hua is a 60 y.o. male who was admitted on 3/8/2021 with complaints of worsening depression and suicidal ideation for last 2 weeks.  He states that he has poor sleep, poor appetite, does not enjoy anything, feels hopeless worthless, has no energy and feels that he would be better off dead.  Severity is reported to be high.  Timing is constant.  Modifying factors include his depression is exacerbated by his recent stressors.  He reports his sister  about 2 weeks ago and it has been very difficult for him to deal with it.  Also reports ongoing problems with alcohol and has a court date coming up.    PAST PSYCHIATRIC HX: None    SUBSTANCE USE HX: Reports he has been an alcoholic most of his life. Quit for 8 years but started two years ago. He states he drinks two double fireball shots in the morning. Reports problems associated with drinking, and has a DUI ticket and a court date coming up for probation violation, and DUI court date may be held in .  Reports no history of DTs or seizures.  Currently reporting no withdrawal symptoms.    SOCIAL HX:   Living situation: Lives alone in [facility]  Employment: On disability  Education: HS graduate  Sexual orientation: Heterosexual  Marital Status:  x3,   Children: One daughter, haven't talked to her in 20 years  Legal History:  DUIs in the past, has a court date for a DUI  Trauma History: Denies    History:  Denies  Baptism: [employer]  Access to firearms: Denies      Past Medical History:   Diagnosis Date   • Alcoholism (CMS/HCC)    • COPD (chronic obstructive pulmonary disease) (CMS/HCC)    • Kidney stone    • Liver disease     • Von Willebrand disease (CMS/HCC)           Past Surgical History:   Procedure Laterality Date   • BACK SURGERY     • KNEE SURGERY     • STOMACH SURGERY         Family History   Problem Relation Age of Onset   • No Known Problems Father    • No Known Problems Mother    • Suicide Attempts Maternal Uncle    • Depression Maternal Uncle    • Alcohol abuse Maternal Uncle    • Alcohol abuse Paternal Uncle          Medications Prior to Admission   Medication Sig Dispense Refill Last Dose   • albuterol sulfate  (90 Base) MCG/ACT inhaler Inhale 1 puff Daily As Needed for Wheezing or Shortness of Air.   3/8/2021 at Unknown time   • benzonatate (TESSALON) 100 MG capsule Take 100 mg by mouth 3 (Three) Times a Day As Needed for Cough.   3/7/2021 at Unknown time   • losartan-hydrochlorothiazide (HYZAAR) 100-25 MG per tablet Take 1 tablet by mouth Daily.   3/7/2021 at Unknown time   • metoprolol tartrate (LOPRESSOR) 25 MG tablet Take 25 mg by mouth Daily.   3/8/2021 at Unknown time   • pantoprazole (PROTONIX) 40 MG EC tablet Take 40 mg by mouth Daily.   3/8/2021 at Unknown time   • tamsulosin (FLOMAX) 0.4 MG capsule 24 hr capsule Take 1 capsule by mouth Every Night. 30 capsule 1 Unknown at Unknown time         ALLERGIES:  Aspirin, Azithromycin, Toradol [ketorolac tromethamine], and Zofran [ondansetron hcl]    Temp:  [97.6 °F (36.4 °C)-98.8 °F (37.1 °C)] 97.9 °F (36.6 °C)  Heart Rate:  [] 91  Resp:  [18] 18  BP: (108-150)/(59-96) 118/59    REVIEW OF SYSTEMS:  Review of Systems   Constitutional: Negative.    HENT: Negative.    Eyes: Negative.    Respiratory: Positive for cough and shortness of breath.    Cardiovascular: Negative.    Gastrointestinal: Positive for nausea.   Endocrine: Negative.    Genitourinary: Negative.    Musculoskeletal: Negative.    Skin: Negative.    Allergic/Immunologic: Negative.    Neurological: Positive for numbness.   Hematological: Negative.    Psychiatric/Behavioral: Positive for  dysphoric mood and suicidal ideas. The patient is nervous/anxious.         OBJECTIVE    PHYSICAL EXAM:  Physical Exam  Constitutional:  Appears well-developed and well-nourished.   HENT:   Head: Normocephalic and atraumatic.   Right Ear: External ear normal.   Left Ear: External ear normal.   Mouth/Throat: Oropharynx is clear and moist.   Eyes: Pupils are equal, round, and reactive to light. Conjunctivae and EOM are normal.   Neck: Normal range of motion. Neck supple.   Cardiovascular: Normal rate, regular rhythm and normal heart sounds.    Pulmonary/Chest: Effort normal and breath sounds normal. No respiratory distress. No wheezes.   Abdominal: Soft. Bowel sounds are normal.No distension. There is no tenderness.   Musculoskeletal: Normal range of motion. No edema or deformity.   Neurological:No cranial nerve deficit. Coordination normal.   Skin: Skin is warm and dry. No rash noted. No erythema.       MENTAL STATUS EXAM:    Hygiene:   fair  Cooperation:  Cooperative  Eye Contact:  Fair  Psychomotor Behavior:  Appropriate  Affect:  Restricted  Hopelessness: 4  Speech:  Normal  Thought Progress:  Goal directed  Thought Content:  Normal  Suicidal:  Suicidal Ideation  Homicidal:  None  Hallucinations:  None  Delusion:  None  Memory:  Intact  Orientation:  Person, Place, Time and Situation  Reliability:  fair  Insight:  Fair  Judgement:  Fair  Impulse Control:  Fair      Imaging Results (Last 24 Hours)     ** No results found for the last 24 hours. **           ECG/EMG Results (most recent)     Procedure Component Value Units Date/Time    ECG 12 Lead [[id number]] Collected: 03/08/21 1436     Updated: 03/09/21 1218     QT Interval 372 ms      QTC Interval 491 ms     Narrative:      Test Reason : Baseline Cardiac Status  Blood Pressure : **/** mmHG  Vent. Rate : 105 BPM     Atrial Rate : 105 BPM     P-R Int : 144 ms          QRS Dur : 082 ms      QT Int : 372 ms       P-R-T Axes : 042 019 061 degrees     QTc Int : 491  ms    Sinus tachycardia  Otherwise normal ECG  No previous ECGs available  Confirmed by Diamond Wilder ([id number]) on 3/9/2021 12:18:26 PM    Referred By:  MICHELA           Confirmed By:Diamond Wilder           Lab Results   Component Value Date    GLUCOSE 83 03/08/2021    BUN 14 03/08/2021    CREATININE 0.76 03/08/2021    EGFRIFNONA 105 03/08/2021    BCR 18.4 03/08/2021    CO2 22.4 03/08/2021    CALCIUM 9.1 03/08/2021    ALBUMIN 3.91 03/08/2021    AST 76 (H) 03/08/2021    ALT 68 (H) 03/08/2021       Lab Results   Component Value Date    WBC 9.27 03/08/2021    HGB 11.0 (L) 03/08/2021    HCT 38.5 03/08/2021    MCV 73.1 (L) 03/08/2021     03/08/2021       Last Urine Toxicity     LAST URINE TOXICITY RESULTS Latest Ref Rng & Units 3/8/2021    AMPHETAMINES SCREEN, URINE Negative Negative    BARBITURATES SCREEN Negative Negative    BENZODIAZEPINE SCREEN, URINE Negative Negative    BUPRENORPHINEUR Negative Negative    COCAINE SCREEN, URINE Negative Negative    METHADONE SCREEN, URINE Negative Negative          Brief Urine Lab Results  (Last result in the past 365 days)      Color   Clarity   Blood   Leuk Est   Nitrite   Protein   CREAT   Urine HCG        03/08/21 1038 Dark Yellow Clear Large (3+) Trace Negative 30 mg/dL (1+)               DATA  Labs reviewed. Potassium 3.4, Alk phos 146, ALT 68, AST 76. Hemoglobin 11.0. UA shows dark yellow color, trace ketone,d 3+ blood, trace leukocyte esterase, 1+ protein, 1+ bilirubin, 13-20 RBC, 3-5 WBC, trace bacteria.  Urine drug screen is negative.  EKG reviewed.  QTC is 491 ms. BALDERRAMA reviewed.   Record reviewed.  No previous detox treatments noted in this hospital.  Reviewed patient's alcohol patient visit notes and patient was seen in August 2018 as an outpatient with urology for hematuria.  Patient has bleeding disorder.    Strengths: Literate and Articulate    Weaknesses:Substance use and Poor coping skills    Code status: Full  Discussed code status with  patient.    ASSESSMENT & PLAN:        Severe major depression, single episode, without psychotic features (CMS/HCC)  -Start Zoloft 25 mg daily.      Depression with suicidal ideation  -SP 3      Alcohol use disorder, moderate, dependence (CMS/HCC)  -Monitor for withdrawals and treat as indicated    Hypertension  -Losartan-hydrochlorothiazide 100-25  -Lopressor 25 mg.      COPD (chronic obstructive pulmonary disease) (CMS/HCC)  -Albuterol inhaler as needed      The patient has been admitted for safety and stabilization.  Patient will be monitored for suicidality daily and maintained on Special Precautions Level 3 (q15 min checks) .  The patient will have individual and group therapy with a master's level therapist. A master treatment plan will be developed and agreed upon by the patient and his/her treatment team.  The patient's estimated length of stay in the hospital is 5-7 days.

## 2021-03-09 NOTE — PLAN OF CARE
Goal Outcome Evaluation:  Plan of Care Reviewed With: patient  Progress: no change  Outcome Summary: Patient cooperative this shift. Rates anxiety and depression both 9. Denies SI/HI and BEBETO.

## 2021-03-09 NOTE — PLAN OF CARE
Problem: Adult Behavioral Health Plan of Care  Goal: Plan of Care Review  Flowsheets  Taken 3/9/2021 1128 by Jaelyn Webster  Consent Given to Review Plan with: No one  Plan of Care Reviewed With: patient  Taken 3/9/2021 0751 by Ole Krueger, RN  Patient Agreement with Plan of Care: agrees     Problem: Adult Behavioral Health Plan of Care  Goal: Patient-Specific Goal (Individualization)  Flowsheets  Taken 3/9/2021 1128  Patient-Specific Goals (Include Timeframe): Patient will deny suicidal ideation at discharge.  Patient will identify 1-2 healthy coping skills prior to discharge.  Individualized Care Needs:   Patient will be given daily psychiatric evaluation, 20 minutes each day   patient will be offered 1-4 individual sessions 20 to 30 minutes in length and daily groups.  We will utilize brief therapy modality.  Therapist will encourage family involvement.  Anxieties, Fears or Concerns: Patient does not note any anxiety or fear at this time.  Taken 3/9/2021 1119  Patient Personal Strengths:   stable living environment   expressive of emotions   expressive of needs   family/social support   resilient   motivated for treatment   motivated for recovery  Patient Vulnerabilities:   substance abuse/addiction   lacks insight into illness     Problem: Adult Behavioral Health Plan of Care  Goal: Optimized Coping Skills in Response to Life Stressors  Intervention: Promote Effective Coping Strategies  Flowsheets (Taken 3/9/2021 1128)  Supportive Measures:   active listening utilized   counseling provided   decision-making supported   goal setting facilitated   self-reflection promoted   self-care encouraged   relaxation techniques promoted   verbalization of feelings encouraged   self-responsibility promoted     Problem: Adult Behavioral Health Plan of Care  Goal: Develops/Participates in Therapeutic [place] to Support Successful Transition  Intervention: Foster Therapeutic [place]  Flowsheets (Taken 3/9/2021 0751 by  Ole Krueger, RN)  Trust Relationship/Rapport:   care explained   choices provided   emotional support provided   empathic listening provided   questions answered   questions encouraged   reassurance provided   thoughts/feelings acknowledged     Problem: Adult Behavioral Health Plan of Care  Goal: Develops/Participates in Therapeutic [place] to Support Successful Transition  Intervention: Mutually Develop Transition Plan  Flowsheets  Taken 3/9/2021 1128  Transition Support: (Therapist recommends that patient consider residential treatment.  Patient is currently refusing.)   community resources reviewed   crisis management plan promoted   crisis management plan verbalized   follow-up care coordinated   follow-up care discussed  Taken 3/9/2021 1113  Discharge Coordination/Progress: Patient has Medicare A and B insurance  Transportation Anticipated: car, drives self  Current Discharge Risk:   psychiatric illness   substance use/abuse  Concerns to be Addressed:   substance/tobacco abuse/use   suicidal   mental health  Readmission Within the Last 30 Days: no previous admission in last 30 days  Patient/Family Anticipated Services at Transition:   outpatient care   mental health services  Patient's Choice of Community Agency(s): Patient is currently considering outpatient options. He is not agreeable for residential  Patient/Family Anticipates Transition to: home   Goal Outcome Evaluation:  Plan of Care Reviewed With: patient    DATA:         Therapist met individually with patient this date to introduce role and to discuss hospitalization expectations. Patient agreeable.      Clinical Maneuvering/Intervention:     Therapist assisted patient in processing above session content; acknowledged and normalized patient’s thoughts, feelings, and concerns.  Discussed the therapist/patient relationship and explain the parameters and limitations of relative confidentiality.  Also discussed the importance of active participation,  "and honesty to the treatment process.  Encouraged the patient to discuss/vent their feelings, frustrations, and fears concerning their ongoing medical issues and validated their feelings.     Discussed the importance of finding enjoyable activities and coping skills that the patient can engage in a regular basis. Discussed healthy coping skills such as distraction, self love, grounding, thought challenges/reframing, etc.  Provided patient with list of healthy coping skills this date. Discussed the importance of medication compliance.  Praised the patient for seeking help and spent the majority of the session building rapport.       Allowed patient to freely discuss issues without interruption or judgment. Provided safe, confidential environment to facilitate the development of positive therapeutic relationship and encourage open, honest communication.      Therapist addressed discharge safety planning this date. Assisted patient in identifying risk factors which would indicate the need for higher level of care after discharge;  including thoughts to harm self or others and/or self-harming behavior. Encouraged patient to call 911, or present to the nearest emergency room should any of these events occur. Discussed crisis intervention services and means to access.  Encouraged securing any objects of harm.       Therapist completed integrated summary, treatment plan, and initiated social history this date.  Therapist is strongly encouraging family involvement in treatment.       ASSESSMENT:      Mr. Venancio Hua is a 60 year old , , disabled male from [facility].  Patient presents to the emergency room with suicidal ideation with a plan to \"shoot his brains out\" or jump off of a building.  Patient reports poor sleep and appetite.  Patient reports that he has been an alcoholic for most of his life and began drinking at the age of 10.  Patient drinking approximately 4 double shots of fireball per " "day.  Patient denies any withdrawal symptoms and states that \"he will be fine as long as he gets his mind right\".  Identifies alcohol as his \"friend\".  Patient currently is on probation and has recently had a parole violation with a DUI.  He had a court date today.  We received a call from patient's  requesting a letter be sent to the court house so that a bench warrant is not issued for the patient.  Patient signed consent for therapist to send a letter to [employer].  A letter was faxed to [phone].    Patient reports longest period of sobriety being approximately 8 years.  Stated that he started drinking again approximately 2 years ago.    Patient reports grief and loss of his sister passing away approximately 2 weeks ago.  States that his depressive episodes have been more intense the last 2 weeks because he and his sister had a strained relationship.  Patient also reports relationship strain with his daughter with whom he has not seen in the past 20 years.  Patient reports having chronic back pain and COPD.    Patient does not have any access to guns.    This is patient's first inpatient hospitalization.  Patient is recommended consider residential treatment.    Patient is encouraged to involve family in treatment.    Dr. Bender talked with patient about starting Zoloft.  Patient states \"will not settle my nerves down\".  Patient is recommended to stop drinking alcohol to reduce his anxiety.     PLAN:       Patient to remain hospitalized this date.     Treatment team will focus efforts on stabilizing patient's acute symptoms while providing education on healthy coping and crisis management to reduce hospitalizations.   Patient requires daily psychiatrist evaluation and 24/7 nursing supervision to promote patient  safety.     Therapist will offer 1-4 individual sessions, 1 therapy group daily, family education, and appropriate referral.    Therapist recommends patient consider residential " treatment.  Patient is currently refusing at this time.

## 2021-03-10 PROCEDURE — 99232 SBSQ HOSP IP/OBS MODERATE 35: CPT | Performed by: PSYCHIATRY & NEUROLOGY

## 2021-03-10 RX ORDER — LORAZEPAM 0.5 MG/1
0.5 TABLET ORAL
Status: DISPENSED | OUTPATIENT
Start: 2021-03-12 | End: 2021-03-13

## 2021-03-10 RX ORDER — ALBUTEROL SULFATE 90 UG/1
2 AEROSOL, METERED RESPIRATORY (INHALATION) EVERY 4 HOURS PRN
Status: DISCONTINUED | OUTPATIENT
Start: 2021-03-10 | End: 2021-03-13 | Stop reason: HOSPADM

## 2021-03-10 RX ORDER — LORAZEPAM 0.5 MG/1
0.5 TABLET ORAL EVERY 4 HOURS PRN
Status: ACTIVE | OUTPATIENT
Start: 2021-03-12 | End: 2021-03-13

## 2021-03-10 RX ORDER — LORAZEPAM 1 MG/1
1 TABLET ORAL EVERY 4 HOURS PRN
Status: ACTIVE | OUTPATIENT
Start: 2021-03-11 | End: 2021-03-12

## 2021-03-10 RX ORDER — LORAZEPAM 1 MG/1
1 TABLET ORAL
Status: COMPLETED | OUTPATIENT
Start: 2021-03-11 | End: 2021-03-11

## 2021-03-10 RX ADMIN — METOPROLOL TARTRATE 25 MG: 25 TABLET, FILM COATED ORAL at 08:17

## 2021-03-10 RX ADMIN — ALUMINUM HYDROXIDE, MAGNESIUM HYDROXIDE, AND DIMETHICONE 15 ML: 400; 400; 40 SUSPENSION ORAL at 21:12

## 2021-03-10 RX ADMIN — SERTRALINE 25 MG: 50 TABLET, FILM COATED ORAL at 08:17

## 2021-03-10 RX ADMIN — ALBUTEROL SULFATE 2 PUFF: 90 AEROSOL, METERED RESPIRATORY (INHALATION) at 23:57

## 2021-03-10 RX ADMIN — TAMSULOSIN HYDROCHLORIDE 0.4 MG: 0.4 CAPSULE ORAL at 21:13

## 2021-03-10 RX ADMIN — Medication 100 MG: at 13:38

## 2021-03-10 RX ADMIN — LOSARTAN POTASSIUM: 50 TABLET, FILM COATED ORAL at 08:16

## 2021-03-10 RX ADMIN — PANTOPRAZOLE SODIUM 40 MG: 40 TABLET, DELAYED RELEASE ORAL at 08:18

## 2021-03-10 RX ADMIN — LORAZEPAM 1.5 MG: 0.5 TABLET ORAL at 15:52

## 2021-03-10 RX ADMIN — LORAZEPAM 1.5 MG: 0.5 TABLET ORAL at 21:12

## 2021-03-10 RX ADMIN — Medication 3 MG: at 23:57

## 2021-03-10 NOTE — PROGRESS NOTES
"INPATIENT PSYCHIATRIC PROGRESS NOTE    Name:  Venancio Hua  :  1960  MRN:  [id number]  Visit Number:  [id number]  Length of stay:  2    SUBJECTIVE  CC/Focus of Exam: depression    INTERVAL HISTORY:  The patient reports he is feeling very nervous and anxious and is not able to sleep good. He would also like to have his inhaler as needed.  Depression rating 8/10  Anxiety rating 9/10  Sleep: not good  Withdrawal sx: anxiety, restlessness, sweating  Cravin/10    Review of Systems   Constitutional: Positive for diaphoresis.   Respiratory: Negative.    Cardiovascular: Negative.    Gastrointestinal:        Poor appetite   Psychiatric/Behavioral: Positive for dysphoric mood and sleep disturbance. The patient is nervous/anxious.        OBJECTIVE    Temp:  [97.3 °F (36.3 °C)-99.1 °F (37.3 °C)] 97.3 °F (36.3 °C)  Heart Rate:  [] 111  Resp:  [18] 18  BP: (100-128)/(57-93) 100/68    MENTAL STATUS EXAM:  Appearance:Casually dressed, good hygeine.   Cooperation:Cooperative  Psychomotor: No psychomotor agitation/retardation, No EPS, No motor tics  Speech-normal rate, amount.  Mood \"anxious\"   Affect-congruent, appropriate, stable  Thought Content-goal directed, no delusional material present  Thought process-linear, organized.  Suicidality: No SI  Homicidality: No HI  Perception: No AH/VH  Insight-fair   Judgement-fair    Lab Results (last 24 hours)     ** No results found for the last 24 hours. **             Imaging Results (Last 24 Hours)     ** No results found for the last 24 hours. **             ECG/EMG Results (most recent)     Procedure Component Value Units Date/Time    ECG 12 Lead [[id number]] Collected: 21 1436     Updated: 21 1218     QT Interval 372 ms      QTC Interval 491 ms     Narrative:      Test Reason : Baseline Cardiac Status  Blood Pressure : **/** mmHG  Vent. Rate : 105 BPM     Atrial Rate : 105 BPM     P-R Int : 144 ms          QRS Dur : 082 ms      QT Int : 372 ms       P-R-T " Axes : 042 019 061 degrees     QTc Int : 491 ms    Sinus tachycardia  Otherwise normal ECG  No previous ECGs available  Confirmed by Diamond Wilder ([id number]) on 3/9/2021 12:18:26 PM    Referred By:  MICHELA           Confirmed By:Diamond Wilder           ALLERGIES: Aspirin, Azithromycin, Toradol [ketorolac tromethamine], and Zofran [ondansetron hcl]      Current Facility-Administered Medications:   •  albuterol sulfate HFA (PROVENTIL HFA;VENTOLIN HFA;PROAIR HFA) inhaler 1 puff, 1 puff, Inhalation, Daily PRN, Dimas Rosario MD, 1 puff at 03/09/21 0822  •  aluminum-magnesium hydroxide-simethicone (MAALOX MAX) 400-400-40 MG/5ML suspension 15 mL, 15 mL, Oral, Q6H PRN, Dimas Rosario MD, 15 mL at 03/09/21 1957  •  benzonatate (TESSALON) capsule 100 mg, 100 mg, Oral, TID PRN, Dimas Rosario MD  •  bisacodyl (DULCOLAX) EC tablet 5 mg, 5 mg, Oral, Daily PRN, Dimas Rosario MD  •  loperamide (IMODIUM) capsule 2 mg, 2 mg, Oral, Q2H PRN, Dimas Rosario MD  •  losartan (COZAAR) 100 mg, hydroCHLOROthiazide (HYDRODIURIL) 25 mg for HYZAAR 100-25, , Oral, Daily, Dimas Rosario MD, Given at 03/10/21 0816  •  magnesium hydroxide (MILK OF MAGNESIA) suspension 2400 mg/10mL 10 mL, 10 mL, Oral, Daily PRN, Dimas Rosario MD  •  melatonin tablet 3 mg, 3 mg, Oral, Nightly PRN, Dimas Rosario MD  •  metoprolol tartrate (LOPRESSOR) tablet 25 mg, 25 mg, Oral, Daily, Dimas Rosario MD, 25 mg at 03/10/21 0817  •  pantoprazole (PROTONIX) EC tablet 40 mg, 40 mg, Oral, Daily, Dimas Rosario MD, 40 mg at 03/10/21 0818  •  Pharmacy Consult, , Does not apply, Continuous PRN, Osiel Bender MD  •  sertraline (ZOLOFT) tablet 25 mg, 25 mg, Oral, Daily, Osiel Bender MD, 25 mg at 03/10/21 0817  •  sodium chloride nasal spray 2 spray, 2 spray, Each Nare, PRN, Dimas Rosario MD  •  tamsulosin (FLOMAX) 24 hr capsule 0.4 mg, 0.4 mg, Oral, Nightly, Dimas Rosario MD, 0.4 mg at 03/09/21 2000  •  thiamine (VITAMIN B-1)  tablet 100 mg, 100 mg, Oral, Daily, Dimas Rosario MD, 100 mg at 03/09/21 0823  •  traZODone (DESYREL) tablet 12.5 mg, 12.5 mg, Oral, Nightly PRN, Dimas Rosario MD, 12.5 mg at 03/09/21 2210    ASSESSMENT & PLAN:      Severe major depression, single episode, without psychotic features (CMS/HCC)  - Continue Zoloft      Depression with suicidal ideation  - SP3    Alcohol use disorder, moderate, dependence (CMS/HCC)  - Short Ativan detox      COPD (chronic obstructive pulmonary disease) (CMS/HCC)  - Albuterol INH      HTN  - Losartan-hydrochlorothiazide 100-25  - Lopressor 25 mg.    Special precautions: Special Precautions Level 3 (q15 min checks) .    Behavioral Health Treatment Plan and Problem List: I have reviewed and approved the Behavioral Health Treatment Plan and Problem list.  The patient has had a chance to review and agrees with the treatment plan.     Clinician:  Osiel Bender MD  03/10/21  11:28 EST

## 2021-03-10 NOTE — DISCHARGE INSTR - APPOINTMENTS
[facility]  [address], [place] [address]  [phone] - phone  [phone] - fax    March 19 2021 at 12:00pm   This is a time to come by the office and complete intake/open your chart.

## 2021-03-10 NOTE — PLAN OF CARE
Goal Outcome Evaluation:  Plan of Care Reviewed With: patient  Progress: improving  Outcome Summary: Pt calm and cooperative. Rates anxiety 9, depression 8. Denies SI, HI, AVH.

## 2021-03-10 NOTE — PLAN OF CARE
Goal Outcome Evaluation:  Plan of Care Reviewed With: patient  Progress: no change  Outcome Summary: Patient cooperative this shift. Spends time in dayroom socializing with peers.
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
## 2021-03-11 PROCEDURE — 99232 SBSQ HOSP IP/OBS MODERATE 35: CPT | Performed by: PSYCHIATRY & NEUROLOGY

## 2021-03-11 RX ADMIN — Medication 100 MG: at 08:53

## 2021-03-11 RX ADMIN — LORAZEPAM 1 MG: 1 TABLET ORAL at 08:51

## 2021-03-11 RX ADMIN — LORAZEPAM 1 MG: 1 TABLET ORAL at 15:53

## 2021-03-11 RX ADMIN — METOPROLOL TARTRATE 25 MG: 25 TABLET, FILM COATED ORAL at 08:53

## 2021-03-11 RX ADMIN — PANTOPRAZOLE SODIUM 40 MG: 40 TABLET, DELAYED RELEASE ORAL at 08:52

## 2021-03-11 RX ADMIN — TAMSULOSIN HYDROCHLORIDE 0.4 MG: 0.4 CAPSULE ORAL at 20:54

## 2021-03-11 RX ADMIN — LORAZEPAM 1 MG: 1 TABLET ORAL at 21:02

## 2021-03-11 RX ADMIN — SERTRALINE 25 MG: 50 TABLET, FILM COATED ORAL at 08:51

## 2021-03-11 RX ADMIN — LOSARTAN POTASSIUM: 50 TABLET, FILM COATED ORAL at 08:54

## 2021-03-11 NOTE — PROGRESS NOTES
"INPATIENT PSYCHIATRIC PROGRESS NOTE    Name:  Venancio Hua  :  1960  MRN:  [id number]  Visit Number:  [id number]  Length of stay:  3    SUBJECTIVE  CC/Focus of Exam: depression    INTERVAL HISTORY:  The patient reports he is feeling some better though he is experiencing some withdrawals and anxiety  Depression rating 8/10  Anxiety rating 6/10  Sleep: better  Withdrawal sx: anxiety, sweating  Cravin/10    Review of Systems   Constitutional: Positive for diaphoresis.   Respiratory: Negative.    Cardiovascular: Negative.    Gastrointestinal:        Poor appetite   Psychiatric/Behavioral: Positive for dysphoric mood. The patient is nervous/anxious.        OBJECTIVE    Temp:  [97.9 °F (36.6 °C)-98.7 °F (37.1 °C)] 98.6 °F (37 °C)  Heart Rate:  [80-98] 80  Resp:  [16-20] 16  BP: ()/(63-83) 94/67    MENTAL STATUS EXAM:  Appearance:Casually dressed, good hygeine.   Cooperation:Cooperative  Psychomotor: No psychomotor agitation/retardation, No EPS, No motor tics  Speech-normal rate, amount.  Mood \"anxious\"   Affect-congruent, appropriate, stable  Thought Content-goal directed, no delusional material present  Thought process-linear, organized.  Suicidality: No SI  Homicidality: No HI  Perception: No AH/VH  Insight-fair   Judgement-fair    Lab Results (last 24 hours)     ** No results found for the last 24 hours. **             Imaging Results (Last 24 Hours)     ** No results found for the last 24 hours. **             ECG/EMG Results (most recent)     Procedure Component Value Units Date/Time    ECG 12 Lead [[id number]] Collected: 21 1436     Updated: 21 1218     QT Interval 372 ms      QTC Interval 491 ms     Narrative:      Test Reason : Baseline Cardiac Status  Blood Pressure : **/** mmHG  Vent. Rate : 105 BPM     Atrial Rate : 105 BPM     P-R Int : 144 ms          QRS Dur : 082 ms      QT Int : 372 ms       P-R-T Axes : 042 019 061 degrees     QTc Int : 491 ms    Sinus tachycardia  Otherwise " normal ECG  No previous ECGs available  Confirmed by Diamond Wilder () on 3/9/2021 12:18:26 PM    Referred By:  MICHELA           Confirmed By:Diamond Wilder           ALLERGIES: Aspirin, Azithromycin, Toradol [ketorolac tromethamine], and Zofran [ondansetron hcl]      Current Facility-Administered Medications:   •  albuterol sulfate HFA (PROVENTIL HFA;VENTOLIN HFA;PROAIR HFA) inhaler 2 puff, 2 puff, Inhalation, Q4H PRN, Osiel Bender MD, 2 puff at 03/10/21 2357  •  aluminum-magnesium hydroxide-simethicone (MAALOX MAX) 400-400-40 MG/5ML suspension 15 mL, 15 mL, Oral, Q6H PRN, Dimas Rosario MD, 15 mL at 03/10/21 2112  •  benzonatate (TESSALON) capsule 100 mg, 100 mg, Oral, TID PRN, Dimas Rosario MD  •  bisacodyl (DULCOLAX) EC tablet 5 mg, 5 mg, Oral, Daily PRN, Dimas Rosario MD  •  loperamide (IMODIUM) capsule 2 mg, 2 mg, Oral, Q2H PRN, Dimas Rosario MD  •  [COMPLETED] LORazepam (ATIVAN) tablet 1.5 mg, 1.5 mg, Oral, 3 times per day, 1.5 mg at 03/10/21 2112 **FOLLOWED BY** LORazepam (ATIVAN) tablet 1 mg, 1 mg, Oral, 3 times per day, 1 mg at 21 0851 **FOLLOWED BY** [START ON 3/12/2021] LORazepam (ATIVAN) tablet 0.5 mg, 0.5 mg, Oral, 3 times per day, Osiel Bender MD  •  [] LORazepam (ATIVAN) tablet 1.5 mg, 1.5 mg, Oral, Q4H PRN **FOLLOWED BY** LORazepam (ATIVAN) tablet 1 mg, 1 mg, Oral, Q4H PRN **FOLLOWED BY** [START ON 3/12/2021] LORazepam (ATIVAN) tablet 0.5 mg, 0.5 mg, Oral, Q4H PRN, Osiel Bender MD  •  losartan (COZAAR) 100 mg, hydroCHLOROthiazide (HYDRODIURIL) 25 mg for HYZAAR 100-25, , Oral, Daily, Dimas Rosario MD, Given at 21 0854  •  magnesium hydroxide (MILK OF MAGNESIA) suspension 2400 mg/10mL 10 mL, 10 mL, Oral, Daily PRN, Dimas Rosario MD  •  melatonin tablet 3 mg, 3 mg, Oral, Nightly PRN, Dimas Rosario MD, 3 mg at 03/10/21 8859  •  metoprolol tartrate (LOPRESSOR) tablet 25 mg, 25 mg, Oral, Daily, Dimas Rosario MD, 25 mg at 21 0817  •   pantoprazole (PROTONIX) EC tablet 40 mg, 40 mg, Oral, Daily, Dimas Rosario MD, 40 mg at 03/11/21 0852  •  Pharmacy Consult, , Does not apply, Continuous PRN, Osiel Bender MD  •  sertraline (ZOLOFT) tablet 25 mg, 25 mg, Oral, Daily, Osiel Bender MD, 25 mg at 03/11/21 0851  •  sodium chloride nasal spray 2 spray, 2 spray, Each Nare, PRN, Dimas Rosario MD  •  tamsulosin (FLOMAX) 24 hr capsule 0.4 mg, 0.4 mg, Oral, Nightly, Dimas Rosario MD, 0.4 mg at 03/10/21 2113  •  thiamine (VITAMIN B-1) tablet 100 mg, 100 mg, Oral, Daily, Dimas Rosario MD, 100 mg at 03/11/21 0853    ASSESSMENT & PLAN:      Severe major depression, single episode, without psychotic features (CMS/HCC)  - Continue Zoloft      Depression with suicidal ideation  - SP3    Alcohol use disorder, moderate, dependence (CMS/HCC)  - Continue Ativan detox      COPD (chronic obstructive pulmonary disease) (CMS/HCC)  - Albuterol INH      HTN  - Losartan-hydrochlorothiazide 100-25  - Lopressor 25 mg.    Special precautions: Special Precautions Level 3 (q15 min checks) .    Behavioral Health Treatment Plan and Problem List: I have reviewed and approved the Behavioral Health Treatment Plan and Problem list.  The patient has had a chance to review and agrees with the treatment plan.     Clinician:  Osiel Bender MD  03/11/21  13:10 EST

## 2021-03-11 NOTE — PROGRESS NOTES
Pharmacy consulted to discuss tobacco cessation with patient. Patient currently smokes cigarettes 0.5 ppd. Patient denies ever trying to quit in the past. He states that he doesn't want to quit at this time, doesn't want a patch while on the unit, and doesn't want outpatient follow-up appointment.      Thank you,    Mayda Maradiaga, PharmD  03/11/21  11:57 EST

## 2021-03-11 NOTE — PLAN OF CARE
Problem: Adult Behavioral Health Plan of Care  Goal: Optimized Coping Skills in Response to Life Stressors  Intervention: Promote Effective Coping Strategies  Flowsheets (Taken 3/11/2021 1355)  Supportive Measures: (Patient enjoys fishing for healthy coping skills)   active listening utilized   counseling provided   decision-making supported   goal setting facilitated   self-care encouraged   self-reflection promoted   relaxation techniques promoted   problem-solving facilitated   verbalization of feelings encouraged   self-responsibility promoted   positive reinforcement provided     Problem: Adult Behavioral Health Plan of Care  Goal: Develops/Participates in Therapeutic [place] to Support Successful Transition  Intervention: Foster Therapeutic [place]  Flowsheets (Taken 3/11/2021 1357)  Trust Relationship/Rapport:   care explained   choices provided   emotional support provided   empathic listening provided   questions answered   questions encouraged   thoughts/feelings acknowledged   reassurance provided     Problem: Adult Behavioral Health Plan of Care  Goal: Develops/Participates in Therapeutic [place] to Support Successful Transition  Intervention: Mutually Develop Transition Plan  Flowsheets (Taken 3/9/2021 1122)  Transition Support: (Therapist recommends that patient consider residential treatment.  Patient is currently refusing.)   community resources reviewed   crisis management plan promoted   crisis management plan verbalized   follow-up care coordinated   follow-up care discussed   Goal Outcome Evaluation:  Plan of Care Reviewed With: patient    Data:  Therapist met with the patient at his bedside. Patient stated that he did not sleep well last night. States that he is continuing to have some mild withdrawals and is continuing on the Ativan detox for a couple of more days. Current withdrawals include hot and cold sweats and anxiety.   We discussed patient's plans at discharge. He states that he  plans to go to [place] and visit with his mother for about a month. States that his niece lives there but plans to stay away from her. States that she blames him for his sister's death. Patient continues to experience grief and loss over the death of his sister and the fact that they had not been close in over 20 years due to a childhood conflict. We discussed making peace with the past in order to move forward. Patient also talks about getting his court issues taken care of and this will also relieve some stress.  Patient is encouraged to involve his family in his treatment but at this time refuses to consent to involve them.    Assessment: Patient states that earlier today he was experiencing suicidal ideation but it is better now. Patient rates his depression at a 8; anxiety at a 6 on a 1-10 scale.    Plan: Patient will continue hospitalization for stabilization. Will continue with detox. Patient will follow up at [facility] at discharge.

## 2021-03-11 NOTE — PLAN OF CARE
"Goal Outcome Evaluation:  Plan of Care Reviewed With: patient  Progress: improving  Outcome Summary: Patient slept through the night, interacted with peers during shift, rated anxiety and depression 8/10, denies SI/HI/AVH, reported back pain 8/10, reported heartburn and nausea, pt calm and cooperative and reports feeling \"not too good\", CIWA 5.  "

## 2021-03-12 PROCEDURE — 99232 SBSQ HOSP IP/OBS MODERATE 35: CPT | Performed by: PSYCHIATRY & NEUROLOGY

## 2021-03-12 RX ADMIN — PANTOPRAZOLE SODIUM 40 MG: 40 TABLET, DELAYED RELEASE ORAL at 08:04

## 2021-03-12 RX ADMIN — BISACODYL 5 MG: 5 TABLET, COATED ORAL at 18:15

## 2021-03-12 RX ADMIN — ALBUTEROL SULFATE 2 PUFF: 90 AEROSOL, METERED RESPIRATORY (INHALATION) at 04:37

## 2021-03-12 RX ADMIN — Medication 100 MG: at 08:04

## 2021-03-12 RX ADMIN — LORAZEPAM 0.5 MG: 0.5 TABLET ORAL at 08:04

## 2021-03-12 RX ADMIN — LOSARTAN POTASSIUM: 50 TABLET, FILM COATED ORAL at 08:06

## 2021-03-12 RX ADMIN — METOPROLOL TARTRATE 25 MG: 25 TABLET, FILM COATED ORAL at 08:07

## 2021-03-12 RX ADMIN — Medication 3 MG: at 21:07

## 2021-03-12 RX ADMIN — LORAZEPAM 0.5 MG: 0.5 TABLET ORAL at 21:08

## 2021-03-12 RX ADMIN — ALBUTEROL SULFATE 2 PUFF: 90 AEROSOL, METERED RESPIRATORY (INHALATION) at 12:21

## 2021-03-12 RX ADMIN — ALBUTEROL SULFATE 2 PUFF: 90 AEROSOL, METERED RESPIRATORY (INHALATION) at 21:09

## 2021-03-12 RX ADMIN — SERTRALINE 25 MG: 50 TABLET, FILM COATED ORAL at 08:04
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
## 2021-03-12 NOTE — PLAN OF CARE
Goal Outcome Evaluation:  Plan of Care Reviewed With: patient  Progress: improving  Outcome Summary: patient has been up in day room today for meals and talking some with other patients.  anxiety/depression 8, denies s/i, tolerating detox when vs stable for ativan doses.

## 2021-03-12 NOTE — PROGRESS NOTES
"INPATIENT PSYCHIATRIC PROGRESS NOTE    Name:  Venancio Hua  :  1960  MRN:  [id number]  Visit Number:  [id number]  Length of stay:  4    SUBJECTIVE  CC/Focus of Exam: depression    INTERVAL HISTORY:  The patient reports he is not feeling too good today. He is on last day of Ativan detox and thinks he will be able to go home tomorrow. Plans to follow-up with [facility] care and stay away from alcohol.   Depression rating 6/10  Anxiety rating 6/10  Sleep: poor  Withdrawal sx: denies  Cravin/10    Review of Systems   Respiratory: Negative.    Cardiovascular: Negative.    Gastrointestinal:        Poor appetite   Psychiatric/Behavioral: Positive for dysphoric mood. The patient is nervous/anxious.        OBJECTIVE    Temp:  [97.2 °F (36.2 °C)-99 °F (37.2 °C)] 97.4 °F (36.3 °C)  Heart Rate:  [] 114  Resp:  [16] 16  BP: (113-129)/(63-83) 119/63    MENTAL STATUS EXAM:  Appearance:Casually dressed, good hygeine.   Cooperation:Cooperative  Psychomotor: No psychomotor agitation/retardation, No EPS, No motor tics  Speech-normal rate, amount.  Mood \"anxious\"   Affect-congruent, appropriate, stable  Thought Content-goal directed, no delusional material present  Thought process-linear, organized.  Suicidality: No SI  Homicidality: No HI  Perception: No AH/VH  Insight-fair   Judgement-fair    Lab Results (last 24 hours)     ** No results found for the last 24 hours. **             Imaging Results (Last 24 Hours)     ** No results found for the last 24 hours. **             ECG/EMG Results (most recent)     Procedure Component Value Units Date/Time    ECG 12 Lead [[id number]] Collected: 21 1436     Updated: 21 1218     QT Interval 372 ms      QTC Interval 491 ms     Narrative:      Test Reason : Baseline Cardiac Status  Blood Pressure : **/** mmHG  Vent. Rate : 105 BPM     Atrial Rate : 105 BPM     P-R Int : 144 ms          QRS Dur : 082 ms      QT Int : 372 ms       P-R-T Axes : 042 019 061 degrees     QTc Int " : 491 ms    Sinus tachycardia  Otherwise normal ECG  No previous ECGs available  Confirmed by Diamond Wilder () on 3/9/2021 12:18:26 PM    Referred By:  MICHELA           Confirmed By:Diamond Wilder           ALLERGIES: Aspirin, Azithromycin, Toradol [ketorolac tromethamine], and Zofran [ondansetron hcl]      Current Facility-Administered Medications:   •  albuterol sulfate HFA (PROVENTIL HFA;VENTOLIN HFA;PROAIR HFA) inhaler 2 puff, 2 puff, Inhalation, Q4H PRN, Osiel Bender MD, 2 puff at 21 0437  •  aluminum-magnesium hydroxide-simethicone (MAALOX MAX) 400-400-40 MG/5ML suspension 15 mL, 15 mL, Oral, Q6H PRN, Dimas Rosario MD, 15 mL at 03/10/21 2112  •  benzonatate (TESSALON) capsule 100 mg, 100 mg, Oral, TID PRN, Dimas Rosario MD  •  bisacodyl (DULCOLAX) EC tablet 5 mg, 5 mg, Oral, Daily PRN, Dimas Rosario MD  •  loperamide (IMODIUM) capsule 2 mg, 2 mg, Oral, Q2H PRN, Dimas Rosario MD  •  [COMPLETED] LORazepam (ATIVAN) tablet 1.5 mg, 1.5 mg, Oral, 3 times per day, 1.5 mg at 03/10/21 2112 **FOLLOWED BY** [COMPLETED] LORazepam (ATIVAN) tablet 1 mg, 1 mg, Oral, 3 times per day, 1 mg at 21 **FOLLOWED BY** LORazepam (ATIVAN) tablet 0.5 mg, 0.5 mg, Oral, 3 times per day, Osiel Bender MD, 0.5 mg at 21 0804  •  [] LORazepam (ATIVAN) tablet 1.5 mg, 1.5 mg, Oral, Q4H PRN **FOLLOWED BY** [] LORazepam (ATIVAN) tablet 1 mg, 1 mg, Oral, Q4H PRN **FOLLOWED BY** LORazepam (ATIVAN) tablet 0.5 mg, 0.5 mg, Oral, Q4H PRN, Osiel Bender MD  •  losartan (COZAAR) 100 mg, hydroCHLOROthiazide (HYDRODIURIL) 25 mg for HYZAAR 100-25, , Oral, Daily, Dimas Rosario MD, Given at 21 0806  •  magnesium hydroxide (MILK OF MAGNESIA) suspension 2400 mg/10mL 10 mL, 10 mL, Oral, Daily PRN, Dimas Rosario MD  •  melatonin tablet 3 mg, 3 mg, Oral, Nightly PRN, Dimas Rosario MD, 3 mg at 03/10/21 2706  •  metoprolol tartrate (LOPRESSOR) tablet 25 mg, 25 mg, Oral, Daily,  Dimas Rosario MD, 25 mg at 03/12/21 0807  •  pantoprazole (PROTONIX) EC tablet 40 mg, 40 mg, Oral, Daily, Dimas Rosario MD, 40 mg at 03/12/21 0804  •  Pharmacy Consult, , Does not apply, Continuous PRN, Osiel Bender MD  •  sertraline (ZOLOFT) tablet 25 mg, 25 mg, Oral, Daily, Osiel Bender MD, 25 mg at 03/12/21 0804  •  sodium chloride nasal spray 2 spray, 2 spray, Each Nare, PRN, Dimas Rosario MD  •  tamsulosin (FLOMAX) 24 hr capsule 0.4 mg, 0.4 mg, Oral, Nightly, Dimas Rosario MD, 0.4 mg at 03/11/21 2054  •  thiamine (VITAMIN B-1) tablet 100 mg, 100 mg, Oral, Daily, Dimas Rosario MD, 100 mg at 03/12/21 0804    ASSESSMENT & PLAN:      Severe major depression, single episode, without psychotic features (CMS/HCC)  - Increase Zoloft 50 mg daily      Depression with suicidal ideation  - SP3    Alcohol use disorder, moderate, dependence (CMS/HCC)  - Continue Ativan detox      COPD (chronic obstructive pulmonary disease) (CMS/HCC)  - Albuterol INH      HTN  - Losartan-hydrochlorothiazide 100-25  - Lopressor 25 mg.    Special precautions: Special Precautions Level 3 (q15 min checks) .    Behavioral Health Treatment Plan and Problem List: I have reviewed and approved the Behavioral Health Treatment Plan and Problem list.  The patient has had a chance to review and agrees with the treatment plan.     Clinician:  Osiel Bender MD  03/12/21  12:02 EST

## 2021-03-12 NOTE — PLAN OF CARE
Goal Outcome Evaluation:  Plan of Care Reviewed With: patient  Progress: no change  Outcome Summary: Patient rates Anx 8 Dep 8 Denies SI, HI, or AVH reports poor eating and sleeping routine

## 2021-03-13 VITALS
HEIGHT: 69 IN | DIASTOLIC BLOOD PRESSURE: 79 MMHG | WEIGHT: 209.4 LBS | SYSTOLIC BLOOD PRESSURE: 110 MMHG | RESPIRATION RATE: 18 BRPM | HEART RATE: 95 BPM | OXYGEN SATURATION: 97 % | BODY MASS INDEX: 31.01 KG/M2 | TEMPERATURE: 97.5 F

## 2021-03-13 PROCEDURE — 99238 HOSP IP/OBS DSCHRG MGMT 30/<: CPT | Performed by: PSYCHIATRY & NEUROLOGY

## 2021-03-13 RX ORDER — LANOLIN ALCOHOL/MO/W.PET/CERES
3 CREAM (GRAM) TOPICAL NIGHTLY PRN
Qty: 30 TABLET | Refills: 0 | Status: SHIPPED | OUTPATIENT
Start: 2021-03-13

## 2021-03-13 RX ADMIN — ALBUTEROL SULFATE 2 PUFF: 90 AEROSOL, METERED RESPIRATORY (INHALATION) at 02:41

## 2021-03-13 RX ADMIN — LOSARTAN POTASSIUM: 50 TABLET, FILM COATED ORAL at 08:04

## 2021-03-13 RX ADMIN — PANTOPRAZOLE SODIUM 40 MG: 40 TABLET, DELAYED RELEASE ORAL at 08:05

## 2021-03-13 RX ADMIN — SERTRALINE 50 MG: 50 TABLET, FILM COATED ORAL at 08:05

## 2021-03-13 RX ADMIN — Medication 100 MG: at 08:05

## 2021-03-13 RX ADMIN — METOPROLOL TARTRATE 25 MG: 25 TABLET, FILM COATED ORAL at 08:04

## 2021-03-13 NOTE — PLAN OF CARE
"Goal Outcome Evaluation:  Plan of Care Reviewed With: patient  Progress: improving  Outcome Summary: Patient has interacted with peers this shift, he has been calm and cooperative, rates anxiety and depression 6/10, denies SI/HI/AVH, reports poor appetite as of recent, rates back pain 8/10, states \"I'm feeling a little better\", CIWA 4.  "

## 2021-03-13 NOTE — DISCHARGE SUMMARY
Date of Discharge:  3/13/2021    Discharge Diagnosis:Principal Problem:    Severe major depression, single episode, without psychotic features (CMS/HCC)  Active Problems:    Alcohol use disorder, moderate, dependence (CMS/HCC)        Presenting Problem/History of Present Illness:  60 y.o. male who was admitted on 3/8/2021 with complaints of worsening depression and suicidal ideation for last 2 weeks.  He states that he has poor sleep, poor appetite, does not enjoy anything, feels hopeless worthless, has no energy and feels that he would be better off dead.  Severity is reported to be high.  Timing is constant.  Modifying factors include his depression is exacerbated by his recent stressors.  He reports his sister  about 2 weeks ago and it has been very difficult for him to deal with it.  Also reports ongoing problems with alcohol and has a court date coming up.   Reports he has been an alcoholic most of his life. Quit for 8 years but started two years ago. He states he drinks two double fireball shots in the morning. Reports problems associated with drinking, and has a DUI ticket and a court date coming up for probation violation, and DUI court date may be held in .    Hospital Course:  Patient was admitted for safety and stabilization.   Routine labs were checked.  Patient was assigned a masters level therapist and provided with an opportunity to participate in group and individual therapy and counseling on the unit.  He was started on short Ativan detox.  Zoloft 25 mg daily was started for depression and titrated to 50 mg daily.  He was continued on home medications for hypertension and COPD.  He tolerated the medication well and demonstrated a good response.  Mood improved.  He tolerated the detox process very well without any complications.  He was discharged on hospital day 5 in stable condition with a plan to follow-up with [facility] and stay away from alcohol.      Consults:   Consults     No orders  found from 2/7/2021 to 3/9/2021.          Labs:  Lab Results (all)     None          Imaging:  Imaging Results (All)     None            Condition on Discharge:  stable    Interval Review of Systems at time of Discharge:  General ROS: negative for - fever.   Endocrine ROS: negative for - palpitations  Respiratory ROS: no cough, shortness of breath, or wheezing  Cardiovascular ROS: no chest pain or dyspnea on exertion  Gastrointestinal ROS: no abdominal pain,no black or bloody stools    Mental Status Exam at time of Discharge:  Mood: normal  Affect: normal   Thought Processes: linear, logical, and goal directed  Oriented X3:  yes  Thought Content: sensorium intact   Suicidal Thoughts: denies  Homicidal Thoughts: denies    Vital Signs  Temp:  [97.5 °F (36.4 °C)-97.9 °F (36.6 °C)] 97.5 °F (36.4 °C)  Heart Rate:  [85-95] 95  Resp:  [18] 18  BP: (110-124)/(73-80) 110/79    Discharge Disposition  Home or Self Care    Discharge Medications     Discharge Medications      New Medications      Instructions Start Date   melatonin 3 MG tablet   3 mg, Oral, Nightly PRN      sertraline 50 MG tablet  Commonly known as: ZOLOFT   50 mg, Oral, Daily   Start Date: March 14, 2021        Continue These Medications      Instructions Start Date   albuterol sulfate  (90 Base) MCG/ACT inhaler  Commonly known as: PROVENTIL HFA;VENTOLIN HFA;PROAIR HFA   1 puff, Inhalation, Daily PRN      benzonatate 100 MG capsule  Commonly known as: TESSALON   100 mg, Oral, 3 Times Daily PRN      losartan-hydrochlorothiazide 100-25 MG per tablet  Commonly known as: HYZAAR   1 tablet, Oral, Daily      metoprolol tartrate 25 MG tablet  Commonly known as: LOPRESSOR   25 mg, Oral, Daily      pantoprazole 40 MG EC tablet  Commonly known as: PROTONIX   40 mg, Oral, Daily      tamsulosin 0.4 MG capsule 24 hr capsule  Commonly known as: Flomax   0.4 mg, Oral, Nightly             Discharge Diet: Regular    Activity at Discharge: As tolerated    Follow-up  Appointments:    No future appointments.        Time: I spent  20 minutes on this discharge activity which included: face-to-face encounter with the patient, reviewing the data in the system, coordination of the care with the nursing staff as well as consultants, documentation, and entering orders.      Evan Davis MD  03/13/21  14:13 EST

## 2021-07-13 ENCOUNTER — HOSPITAL ENCOUNTER (INPATIENT)
Dept: HOSPITAL 79 - PROG CARE | Age: 61
LOS: 4 days | Discharge: HOME | DRG: 273 | End: 2021-07-17
Attending: INTERNAL MEDICINE | Admitting: INTERNAL MEDICINE
Payer: MEDICARE

## 2021-07-13 VITALS — HEIGHT: 69 IN | WEIGHT: 200 LBS | BODY MASS INDEX: 29.62 KG/M2

## 2021-07-13 DIAGNOSIS — F17.210: ICD-10-CM

## 2021-07-13 DIAGNOSIS — B18.2: ICD-10-CM

## 2021-07-13 DIAGNOSIS — Z80.9: ICD-10-CM

## 2021-07-13 DIAGNOSIS — I20.9: ICD-10-CM

## 2021-07-13 DIAGNOSIS — Z20.822: ICD-10-CM

## 2021-07-13 DIAGNOSIS — R07.9: ICD-10-CM

## 2021-07-13 DIAGNOSIS — Z82.49: ICD-10-CM

## 2021-07-13 DIAGNOSIS — D50.9: ICD-10-CM

## 2021-07-13 DIAGNOSIS — I10: ICD-10-CM

## 2021-07-13 DIAGNOSIS — Z88.6: ICD-10-CM

## 2021-07-13 DIAGNOSIS — Q27.33: ICD-10-CM

## 2021-07-13 DIAGNOSIS — D68.0: ICD-10-CM

## 2021-07-13 DIAGNOSIS — Z79.899: ICD-10-CM

## 2021-07-13 DIAGNOSIS — I47.1: Primary | ICD-10-CM

## 2021-07-13 DIAGNOSIS — K74.60: ICD-10-CM

## 2021-07-13 DIAGNOSIS — K55.21: ICD-10-CM

## 2021-07-13 DIAGNOSIS — R55: ICD-10-CM

## 2021-07-13 DIAGNOSIS — Z88.8: ICD-10-CM

## 2021-07-13 DIAGNOSIS — J44.9: ICD-10-CM

## 2021-07-13 DIAGNOSIS — S40.011A: ICD-10-CM

## 2021-07-13 PROCEDURE — C1730 CATH, EP, 19 OR FEW ELECT: HCPCS

## 2021-07-13 PROCEDURE — C1733 CATH, EP, OTHR THAN COOL-TIP: HCPCS

## 2021-07-13 PROCEDURE — C9113 INJ PANTOPRAZOLE SODIUM, VIA: HCPCS

## 2021-07-13 PROCEDURE — U0002 COVID-19 LAB TEST NON-CDC: HCPCS

## 2021-07-13 PROCEDURE — A9502 TC99M TETROFOSMIN: HCPCS

## 2021-07-13 PROCEDURE — C1766 INTRO/SHEATH,STRBLE,NON-PEEL: HCPCS

## 2021-07-14 LAB
BUN/CREATININE RATIO: 14 (ref 0–10)
HGB BLD-MCNC: 8.2 GM/DL (ref 14–17.5)
RED BLOOD COUNT: 3.7 M/UL (ref 4.2–5.5)
WHITE BLOOD COUNT: 8.7 K/UL (ref 4.5–11)

## 2021-07-15 LAB
BUN/CREATININE RATIO: 17 (ref 0–10)
HGB BLD-MCNC: 7.5 GM/DL (ref 14–17.5)
HGB BLD-MCNC: 7.7 GM/DL (ref 14–17.5)
RED BLOOD COUNT: 3.47 M/UL (ref 4.2–5.5)
RED BLOOD COUNT: 3.52 M/UL (ref 4.2–5.5)
WHITE BLOOD COUNT: 4.8 K/UL (ref 4.5–11)
WHITE BLOOD COUNT: 6.8 K/UL (ref 4.5–11)

## 2021-07-15 PROCEDURE — B24BZZZ ULTRASONOGRAPHY OF HEART WITH AORTA: ICD-10-PCS | Performed by: INTERNAL MEDICINE

## 2021-07-15 NOTE — NUR
PER MD PATIENT TO RECEIVE HUMATE-P PER IV. DISCUSSED WITH PHARMACY PROPER IV
ADMINISTRATION OF THIS MEDICATION. PATIENT STATES HE HAS HAD THIS MEDICATION
IN THE PAST AND TOLERATED WELL. MD AWARE. INSTRUCTED TO HOLD PRBC'S TODAY. MD
AND SUPERVISOR AWARE

## 2021-07-15 NOTE — NUR
TRANSFER PATIENT BACK TO CHAIR, ASSIST OF 2 AND WALKER. PATIENT TRANSFERRED
WITH NO TROUBLE NON WEIGHT BEARING TO THE LEFT LEG, PIVOT ON RIGHT,
RESTING QUIETLY WILL CONTINUE TO MONITOR

## 2021-07-16 LAB
BUN/CREATININE RATIO: 16 (ref 0–10)
HGB BLD-MCNC: 7.5 GM/DL (ref 14–17.5)
RED BLOOD COUNT: 3.39 M/UL (ref 4.2–5.5)
WHITE BLOOD COUNT: 4.6 K/UL (ref 4.5–11)

## 2021-07-16 PROCEDURE — 4A023FZ MEASUREMENT OF CARDIAC RHYTHM, PERCUTANEOUS APPROACH: ICD-10-PCS | Performed by: INTERNAL MEDICINE

## 2021-07-16 PROCEDURE — 02K83ZZ MAP CONDUCTION MECHANISM, PERCUTANEOUS APPROACH: ICD-10-PCS | Performed by: INTERNAL MEDICINE

## 2021-07-16 PROCEDURE — 4A0234Z MEASUREMENT OF CARDIAC ELECTRICAL ACTIVITY, PERCUTANEOUS APPROACH: ICD-10-PCS | Performed by: INTERNAL MEDICINE

## 2021-07-16 PROCEDURE — 02583ZZ DESTRUCTION OF CONDUCTION MECHANISM, PERCUTANEOUS APPROACH: ICD-10-PCS | Performed by: INTERNAL MEDICINE

## 2021-07-17 LAB
BUN/CREATININE RATIO: 16 (ref 0–10)
HEP C VIRUS AB: >11 (ref 0–0.9)
HGB BLD-MCNC: 7.8 GM/DL (ref 14–17.5)
RED BLOOD COUNT: 3.59 M/UL (ref 4.2–5.5)
WHITE BLOOD COUNT: 4 K/UL (ref 4.5–11)